# Patient Record
Sex: MALE | Race: WHITE | NOT HISPANIC OR LATINO | Employment: OTHER | URBAN - METROPOLITAN AREA
[De-identification: names, ages, dates, MRNs, and addresses within clinical notes are randomized per-mention and may not be internally consistent; named-entity substitution may affect disease eponyms.]

---

## 2017-09-15 ENCOUNTER — HOSPITAL ENCOUNTER (OUTPATIENT)
Dept: CARDIOLOGY | Facility: HOSPITAL | Age: 77
Discharge: HOME | End: 2017-09-15
Admitting: INTERNAL MEDICINE

## 2017-09-15 ENCOUNTER — HOSPITAL ENCOUNTER (OUTPATIENT)
Dept: CARDIOLOGY | Facility: HOSPITAL | Age: 77
Discharge: HOME | End: 2017-09-15
Attending: INTERNAL MEDICINE

## 2017-09-15 VITALS
SYSTOLIC BLOOD PRESSURE: 173 MMHG | HEIGHT: 71 IN | BODY MASS INDEX: 26.6 KG/M2 | DIASTOLIC BLOOD PRESSURE: 104 MMHG | RESPIRATION RATE: 20 BRPM | HEART RATE: 98 BPM | OXYGEN SATURATION: 100 % | WEIGHT: 190 LBS

## 2017-09-15 DIAGNOSIS — I10 ESSENTIAL HYPERTENSION: ICD-10-CM

## 2017-09-15 DIAGNOSIS — R00.2 PALPITATIONS: ICD-10-CM

## 2017-09-15 DIAGNOSIS — I20.8 STABLE ANGINA (HCC): ICD-10-CM

## 2017-09-15 DIAGNOSIS — R07.9 CHEST PAIN, UNSPECIFIED TYPE: Primary | ICD-10-CM

## 2017-09-15 DIAGNOSIS — R07.9 CHEST PAIN, UNSPECIFIED TYPE: ICD-10-CM

## 2017-09-15 PROBLEM — I20.89 STABLE ANGINA: Status: ACTIVE | Noted: 2017-09-15

## 2017-09-15 LAB
ALBUMIN SERPL-MCNC: 4.4 G/DL (ref 3.5–5.2)
ALBUMIN/GLOB SERPL: 1.3 G/DL
ALP SERPL-CCNC: 70 U/L (ref 39–117)
ALT SERPL W P-5'-P-CCNC: 25 U/L (ref 1–41)
ANION GAP SERPL CALCULATED.3IONS-SCNC: 13.8 MMOL/L
ASCENDING AORTA: 3.4 CM
AST SERPL-CCNC: 26 U/L (ref 1–40)
BASOPHILS # BLD AUTO: 0.03 10*3/MM3 (ref 0–0.2)
BASOPHILS NFR BLD AUTO: 0.4 % (ref 0–1.5)
BH CV ECHO MEAS - ACS: 2.4 CM
BH CV ECHO MEAS - AO MAX PG (FULL): 2.4 MMHG
BH CV ECHO MEAS - AO MAX PG: 6.3 MMHG
BH CV ECHO MEAS - AO MEAN PG (FULL): 1.6 MMHG
BH CV ECHO MEAS - AO MEAN PG: 3.8 MMHG
BH CV ECHO MEAS - AO ROOT AREA (BSA CORRECTED): 1.6
BH CV ECHO MEAS - AO ROOT AREA: 8.9 CM^2
BH CV ECHO MEAS - AO ROOT DIAM: 3.4 CM
BH CV ECHO MEAS - AO V2 MAX: 125.2 CM/SEC
BH CV ECHO MEAS - AO V2 MEAN: 93.4 CM/SEC
BH CV ECHO MEAS - AO V2 VTI: 29.9 CM
BH CV ECHO MEAS - AVA(I,A): 2.7 CM^2
BH CV ECHO MEAS - AVA(I,D): 2.7 CM^2
BH CV ECHO MEAS - AVA(V,A): 3 CM^2
BH CV ECHO MEAS - AVA(V,D): 3 CM^2
BH CV ECHO MEAS - BSA(HAYCOCK): 2.1 M^2
BH CV ECHO MEAS - BSA: 2.1 M^2
BH CV ECHO MEAS - BZI_BMI: 26.5 KILOGRAMS/M^2
BH CV ECHO MEAS - BZI_METRIC_HEIGHT: 180.3 CM
BH CV ECHO MEAS - BZI_METRIC_WEIGHT: 86.2 KG
BH CV ECHO MEAS - CONTRAST EF (2CH): 67.1 ML/M^2
BH CV ECHO MEAS - CONTRAST EF 4CH: 66.2 ML/M^2
BH CV ECHO MEAS - EDV(MOD-SP2): 70 ML
BH CV ECHO MEAS - EDV(MOD-SP4): 65 ML
BH CV ECHO MEAS - EDV(TEICH): 105.4 ML
BH CV ECHO MEAS - EF(CUBED): 73.7 %
BH CV ECHO MEAS - EF(MOD-SP2): 67.1 %
BH CV ECHO MEAS - EF(MOD-SP4): 66.2 %
BH CV ECHO MEAS - EF(TEICH): 65.4 %
BH CV ECHO MEAS - ESV(MOD-SP2): 23 ML
BH CV ECHO MEAS - ESV(MOD-SP4): 22 ML
BH CV ECHO MEAS - ESV(TEICH): 36.5 ML
BH CV ECHO MEAS - FS: 35.9 %
BH CV ECHO MEAS - IVS/LVPW: 0.95
BH CV ECHO MEAS - IVSD: 0.85 CM
BH CV ECHO MEAS - LAT PEAK E' VEL: 10 CM/SEC
BH CV ECHO MEAS - LV DIASTOLIC VOL/BSA (35-75): 31.5 ML/M^2
BH CV ECHO MEAS - LV MASS(C)D: 140.3 GRAMS
BH CV ECHO MEAS - LV MASS(C)DI: 68 GRAMS/M^2
BH CV ECHO MEAS - LV MAX PG: 3.8 MMHG
BH CV ECHO MEAS - LV MEAN PG: 2.1 MMHG
BH CV ECHO MEAS - LV SYSTOLIC VOL/BSA (12-30): 10.7 ML/M^2
BH CV ECHO MEAS - LV V1 MAX: 97.9 CM/SEC
BH CV ECHO MEAS - LV V1 MEAN: 65 CM/SEC
BH CV ECHO MEAS - LV V1 VTI: 21.3 CM
BH CV ECHO MEAS - LVIDD: 4.8 CM
BH CV ECHO MEAS - LVIDS: 3.1 CM
BH CV ECHO MEAS - LVLD AP2: 7.6 CM
BH CV ECHO MEAS - LVLD AP4: 7.6 CM
BH CV ECHO MEAS - LVLS AP2: 6.4 CM
BH CV ECHO MEAS - LVLS AP4: 6.4 CM
BH CV ECHO MEAS - LVOT AREA (M): 3.8 CM^2
BH CV ECHO MEAS - LVOT AREA: 3.8 CM^2
BH CV ECHO MEAS - LVOT DIAM: 2.2 CM
BH CV ECHO MEAS - LVPWD: 0.9 CM
BH CV ECHO MEAS - MED PEAK E' VEL: 11 CM/SEC
BH CV ECHO MEAS - MR MAX PG: 44.7 MMHG
BH CV ECHO MEAS - MR MAX VEL: 334.5 CM/SEC
BH CV ECHO MEAS - MV A DUR: 0.14 SEC
BH CV ECHO MEAS - MV A MAX VEL: 111.4 CM/SEC
BH CV ECHO MEAS - MV DEC SLOPE: 323.8 CM/SEC^2
BH CV ECHO MEAS - MV DEC TIME: 0.18 SEC
BH CV ECHO MEAS - MV E MAX VEL: 60.6 CM/SEC
BH CV ECHO MEAS - MV E/A: 0.54
BH CV ECHO MEAS - MV MAX PG: 5.4 MMHG
BH CV ECHO MEAS - MV MEAN PG: 2.1 MMHG
BH CV ECHO MEAS - MV P1/2T MAX VEL: 61.2 CM/SEC
BH CV ECHO MEAS - MV P1/2T: 55.4 MSEC
BH CV ECHO MEAS - MV V2 MAX: 116.1 CM/SEC
BH CV ECHO MEAS - MV V2 MEAN: 64.4 CM/SEC
BH CV ECHO MEAS - MV V2 VTI: 25.4 CM
BH CV ECHO MEAS - MVA P1/2T LCG: 3.6 CM^2
BH CV ECHO MEAS - MVA(P1/2T): 4 CM^2
BH CV ECHO MEAS - MVA(VTI): 3.2 CM^2
BH CV ECHO MEAS - PA ACC TIME: 0.1 SEC
BH CV ECHO MEAS - PA MAX PG (FULL): 3.1 MMHG
BH CV ECHO MEAS - PA MAX PG: 4.3 MMHG
BH CV ECHO MEAS - PA PR(ACCEL): 36.2 MMHG
BH CV ECHO MEAS - PA V2 MAX: 103.6 CM/SEC
BH CV ECHO MEAS - PULM A REVS DUR: 0.11 SEC
BH CV ECHO MEAS - PULM A REVS VEL: 28.4 CM/SEC
BH CV ECHO MEAS - PULM DIAS VEL: 37.7 CM/SEC
BH CV ECHO MEAS - PULM S/D: 1.1
BH CV ECHO MEAS - PULM SYS VEL: 43 CM/SEC
BH CV ECHO MEAS - PVA(V,A): 1.8 CM^2
BH CV ECHO MEAS - PVA(V,D): 1.8 CM^2
BH CV ECHO MEAS - QP/QS: 0.55
BH CV ECHO MEAS - RAP SYSTOLE: 3 MMHG
BH CV ECHO MEAS - RV MAX PG: 1.2 MMHG
BH CV ECHO MEAS - RV MEAN PG: 0.78 MMHG
BH CV ECHO MEAS - RV V1 MAX: 53.8 CM/SEC
BH CV ECHO MEAS - RV V1 MEAN: 42.5 CM/SEC
BH CV ECHO MEAS - RV V1 VTI: 12.7 CM
BH CV ECHO MEAS - RVOT AREA: 3.5 CM^2
BH CV ECHO MEAS - RVOT DIAM: 2.1 CM
BH CV ECHO MEAS - RVSP: 25 MMHG
BH CV ECHO MEAS - SI(AO): 129 ML/M^2
BH CV ECHO MEAS - SI(CUBED): 38.5 ML/M^2
BH CV ECHO MEAS - SI(LVOT): 39.4 ML/M^2
BH CV ECHO MEAS - SI(MOD-SP2): 22.8 ML/M^2
BH CV ECHO MEAS - SI(MOD-SP4): 20.8 ML/M^2
BH CV ECHO MEAS - SI(TEICH): 33.4 ML/M^2
BH CV ECHO MEAS - SUP REN AO DIAM: 2.1 CM
BH CV ECHO MEAS - SV(AO): 266.1 ML
BH CV ECHO MEAS - SV(CUBED): 79.4 ML
BH CV ECHO MEAS - SV(LVOT): 81.3 ML
BH CV ECHO MEAS - SV(MOD-SP2): 47 ML
BH CV ECHO MEAS - SV(MOD-SP4): 43 ML
BH CV ECHO MEAS - SV(RVOT): 45 ML
BH CV ECHO MEAS - SV(TEICH): 69 ML
BH CV ECHO MEAS - TAPSE (>1.6): 2 CM2
BH CV ECHO MEAS - TR MAX VEL: 235.6 CM/SEC
BH CV XLRA - RV BASE: 3.2 CM
BH CV XLRA - TDI S': 14 CM/SEC
BILIRUB SERPL-MCNC: 1 MG/DL (ref 0.1–1.2)
BUN BLD-MCNC: 15 MG/DL (ref 8–23)
BUN/CREAT SERPL: 14.7 (ref 7–25)
CALCIUM SPEC-SCNC: 9.4 MG/DL (ref 8.6–10.5)
CHLORIDE SERPL-SCNC: 98 MMOL/L (ref 98–107)
CO2 SERPL-SCNC: 23.2 MMOL/L (ref 22–29)
CREAT BLD-MCNC: 1.02 MG/DL (ref 0.76–1.27)
D DIMER PPP FEU-MCNC: 0.92 MCGFEU/ML (ref 0–0.49)
DEPRECATED RDW RBC AUTO: 42.5 FL (ref 37–54)
E/E' RATIO: 10.5
EOSINOPHIL # BLD AUTO: 0.07 10*3/MM3 (ref 0–0.7)
EOSINOPHIL NFR BLD AUTO: 0.9 % (ref 0.3–6.2)
ERYTHROCYTE [DISTWIDTH] IN BLOOD BY AUTOMATED COUNT: 12.9 % (ref 11.5–14.5)
GFR SERPL CREATININE-BSD FRML MDRD: 71 ML/MIN/1.73
GLOBULIN UR ELPH-MCNC: 3.3 GM/DL
GLUCOSE BLD-MCNC: 98 MG/DL (ref 65–99)
HCT VFR BLD AUTO: 42.7 % (ref 40.4–52.2)
HGB BLD-MCNC: 14.6 G/DL (ref 13.7–17.6)
IMM GRANULOCYTES # BLD: 0.02 10*3/MM3 (ref 0–0.03)
IMM GRANULOCYTES NFR BLD: 0.3 % (ref 0–0.5)
LEFT ATRIUM VOLUME INDEX: 29 ML/M2
LYMPHOCYTES # BLD AUTO: 2.09 10*3/MM3 (ref 0.9–4.8)
LYMPHOCYTES NFR BLD AUTO: 27.8 % (ref 19.6–45.3)
MCH RBC QN AUTO: 30.7 PG (ref 27–32.7)
MCHC RBC AUTO-ENTMCNC: 34.2 G/DL (ref 32.6–36.4)
MCV RBC AUTO: 89.7 FL (ref 79.8–96.2)
MONOCYTES # BLD AUTO: 0.78 10*3/MM3 (ref 0.2–1.2)
MONOCYTES NFR BLD AUTO: 10.4 % (ref 5–12)
NEUTROPHILS # BLD AUTO: 4.54 10*3/MM3 (ref 1.9–8.1)
NEUTROPHILS NFR BLD AUTO: 60.2 % (ref 42.7–76)
NT-PROBNP SERPL-MCNC: 111.7 PG/ML (ref 0–1800)
PLATELET # BLD AUTO: 326 10*3/MM3 (ref 140–500)
PMV BLD AUTO: 9.3 FL (ref 6–12)
POTASSIUM BLD-SCNC: 4 MMOL/L (ref 3.5–5.2)
PROT SERPL-MCNC: 7.7 G/DL (ref 6–8.5)
RBC # BLD AUTO: 4.76 10*6/MM3 (ref 4.6–6)
SINUS: 3.7 CM
SODIUM BLD-SCNC: 135 MMOL/L (ref 136–145)
STJ: 3.2 CM
TROPONIN T SERPL-MCNC: <0.01 NG/ML (ref 0–0.03)
WBC NRBC COR # BLD: 7.53 10*3/MM3 (ref 4.5–10.7)

## 2017-09-15 PROCEDURE — 83880 ASSAY OF NATRIURETIC PEPTIDE: CPT | Performed by: INTERNAL MEDICINE

## 2017-09-15 PROCEDURE — 85025 COMPLETE CBC W/AUTO DIFF WBC: CPT | Performed by: INTERNAL MEDICINE

## 2017-09-15 PROCEDURE — 80053 COMPREHEN METABOLIC PANEL: CPT | Performed by: INTERNAL MEDICINE

## 2017-09-15 PROCEDURE — 85379 FIBRIN DEGRADATION QUANT: CPT | Performed by: INTERNAL MEDICINE

## 2017-09-15 PROCEDURE — 93010 ELECTROCARDIOGRAM REPORT: CPT | Performed by: INTERNAL MEDICINE

## 2017-09-15 PROCEDURE — 84484 ASSAY OF TROPONIN QUANT: CPT | Performed by: INTERNAL MEDICINE

## 2017-09-15 PROCEDURE — 93306 TTE W/DOPPLER COMPLETE: CPT

## 2017-09-15 PROCEDURE — 93005 ELECTROCARDIOGRAM TRACING: CPT | Performed by: INTERNAL MEDICINE

## 2017-09-15 PROCEDURE — 99204 OFFICE O/P NEW MOD 45 MIN: CPT | Performed by: INTERNAL MEDICINE

## 2017-09-15 PROCEDURE — 94760 N-INVAS EAR/PLS OXIMETRY 1: CPT

## 2017-09-15 PROCEDURE — 93306 TTE W/DOPPLER COMPLETE: CPT | Performed by: INTERNAL MEDICINE

## 2017-09-15 PROCEDURE — 36415 COLL VENOUS BLD VENIPUNCTURE: CPT

## 2017-09-15 RX ORDER — SODIUM CHLORIDE 0.9 % (FLUSH) 0.9 %
10 SYRINGE (ML) INJECTION AS NEEDED
Status: DISCONTINUED | OUTPATIENT
Start: 2017-09-15 | End: 2017-09-15

## 2017-09-15 RX ORDER — NITROGLYCERIN 0.4 MG/1
0.4 TABLET SUBLINGUAL
Status: DISCONTINUED | OUTPATIENT
Start: 2017-09-15 | End: 2017-09-15

## 2017-09-15 RX ORDER — RANITIDINE HCL 75 MG
75 TABLET ORAL NIGHTLY
COMMUNITY
End: 2020-06-24

## 2017-09-15 NOTE — PROGRESS NOTES
Date of Office Visit: 09/15/2017  Encounter Provider: Raudel Álvarez MD  Place of Service: [facility] CARDIOLOGY  Patient Name: Son Dang  :1940    Chief Complaint   Patient presents with   • Chest Pain   :     HPI: Son Dang is a 76 y.o. male who presents today for evaluation of chest tightness and palpitations.  He is  Sandra Chunrandy's father and he drove up from [email or web address].    He denies any prior history of heart disease.  He has treated hypertension.  He denies diabetes or hyperlipidemia and has never smoked.  He does not have a family history of significant cardiac disease.      Last night, he woke up with palpitations.  He had a regularly irregular beat which sounds like it was initially trigeminy.  He checked his blood pressure, which was 112/70, and monitored his pulse until the ectopics became less and less frequent. He was acutely aware of them but they didn't make him lightheaded; he didn't have chest pain or dyspnea during that episode.    He also notes exertional chest tightness for the last month.  It has not happened at rest.  It doesn't happen with low levels of exertion but it is predictable with moderate levels, and gets worse as he goes faster or works harder.  When he stops to rest, it resolves.  It does make him short winded, but he denies diaphoresis or nausea.      He denies orthopnea or PND.  He has mild left pedal edema but had a prior hip surgery.  He does not have bleeding problems.      Past Medical History:   Diagnosis Date   • Asthma    • HTN (hypertension)        Past Surgical History:   Procedure Laterality Date   • BACK SURGERY      lumbar laminectomy   • HIP SURGERY Left 2016       Social History     Social History   • Marital status:      Spouse name: N/A   • Number of children: N/A   • Years of education: master's degree     Occupational History   • Retired      Social History Main Topics   • Smoking status: Never Smoker   •  "Smokeless tobacco: Never Used   • Alcohol use Yes      Comment: once a day   • Drug use: No   • Sexual activity: Not on file     Other Topics Concern   • Not on file     Social History Narrative       History reviewed. No pertinent family history.    Review of Systems   Cardiovascular: Positive for chest pain and palpitations.   Musculoskeletal: Positive for back pain.   All other systems reviewed and are negative.      No Known Allergies      Current Outpatient Prescriptions:   •  Coenzyme Q10 (COQ10) 200 MG capsule, Take  by mouth., Disp: , Rfl:   •  irbesartan (AVAPRO) 150 MG tablet, Take  by mouth., Disp: , Rfl:   •  NIFEdipine XL (PROCARDIA XL) 30 MG 24 hr tablet, Take  by mouth., Disp: , Rfl:   •  omeprazole (PriLOSEC) 20 MG capsule, Take  by mouth., Disp: , Rfl:   •  pitavastatin calcium (LIVALO) 2 MG tablet tablet, Take  by mouth., Disp: , Rfl:   •  raNITIdine (ZANTAC) 75 MG tablet, Take 75 mg by mouth Every Night., Disp: , Rfl:   •  VOLTAREN 1 % gel gel, , Disp: , Rfl:   •  metoprolol tartrate (LOPRESSOR) 25 MG tablet, Take 1 tablet by mouth 2 (Two) Times a Day., Disp: 60 tablet, Rfl: 0  No current facility-administered medications for this encounter.      Objective:     Vitals:    09/15/17 1430 09/15/17 1433   BP: 162/98 (!) 173/104   BP Location: Left arm Right arm   Patient Position: Sitting Sitting   Pulse: 98    Resp: 20    SpO2: 100%    Weight: 190 lb (86.2 kg)    Height: 71\" (180.3 cm)      Body mass index is 26.5 kg/(m^2).    Physical Exam   Constitutional: He is oriented to person, place, and time. He appears well-developed and well-nourished.   HENT:   Head: Normocephalic.   Nose: Nose normal.   Mouth/Throat: Oropharynx is clear and moist.   Eyes: Conjunctivae and EOM are normal. Pupils are equal, round, and reactive to light.   Neck: Normal range of motion. No JVD present.   Cardiovascular: Normal rate, regular rhythm, normal heart sounds and intact distal pulses.    No murmur " heard.  Pulmonary/Chest: Effort normal and breath sounds normal.   Abdominal: Soft. He exhibits no mass. There is no tenderness.   Musculoskeletal: Normal range of motion. He exhibits no edema.   Lymphadenopathy:     He has no cervical adenopathy.   Neurological: He is alert and oriented to person, place, and time. No cranial nerve deficit.   Skin: Skin is warm and dry. No rash noted.   Psychiatric: He has a normal mood and affect. His behavior is normal. Judgment and thought content normal.   Vitals reviewed.        ECG 12 Lead  Date/Time: 9/15/2017 4:22 PM  Performed by: MALORIE OREILLY  Authorized by: MALORIE OREILLY   Comparison: not compared with previous ECG   Previous ECG: no previous ECG available  Rhythm: sinus rhythm  Conduction: conduction normal  ST Segments: ST segments normal  T Waves: T waves normal  QRS axis: normal  Other: no other findings  Clinical impression: normal ECG              Assessment:       Diagnosis Plan   1. Stable angina  Case Request Cath Lab: Left ventriculography, Left Heart Cath, Coronary angiography   2.  Palpitations        3. Hypertension           Plan:       Mr. Dang has symptoms consistent with stable angina.  Today, he had normal labs including CBC, CMP, BNP, and troponin.  His D-Dimer was minimally elevated, but his symptoms are not suggestive of PE, and his right heart and RVSP are normal on echo.  Given the typical nature of his symptoms, I have recommended coronary angiography as our first assessment.  I have recommended daily low dose aspirin and have started metoprolol tartrate, 25mg BID.  He will have an outpatient cath in just a few days.  He knows to seek emergency care if he develops symptoms at rest.    2.  His palpitations are most certainly ventricular ectopics.  He had monomorphic PVCs on telemetry today.  His LVEF is normal and his cath is pending.  The metoprolol may help.    3.  His BP was very high today but he is appropriately anxious about the visit. He  checks at home and his average BP is ~140/70.  He will occasionally have higher values after exercising, and then other times it's completely normal.  We'll follow this.      Sincerely,       Raudel Álvarez MD

## 2017-09-19 ENCOUNTER — HOSPITAL ENCOUNTER (OUTPATIENT)
Facility: HOSPITAL | Age: 77
Setting detail: HOSPITAL OUTPATIENT SURGERY
Discharge: HOME | End: 2017-09-19
Attending: INTERNAL MEDICINE | Admitting: INTERNAL MEDICINE

## 2017-09-19 VITALS
SYSTOLIC BLOOD PRESSURE: 125 MMHG | DIASTOLIC BLOOD PRESSURE: 86 MMHG | HEIGHT: 71 IN | RESPIRATION RATE: 18 BRPM | BODY MASS INDEX: 26.6 KG/M2 | OXYGEN SATURATION: 96 % | HEART RATE: 57 BPM | WEIGHT: 190 LBS | TEMPERATURE: 98.7 F

## 2017-09-19 DIAGNOSIS — I20.8 STABLE ANGINA (HCC): ICD-10-CM

## 2017-09-19 PROCEDURE — 93458 L HRT ARTERY/VENTRICLE ANGIO: CPT | Performed by: INTERNAL MEDICINE

## 2017-09-19 PROCEDURE — 25010000002 HEPARIN (PORCINE) PER 1000 UNITS: Performed by: INTERNAL MEDICINE

## 2017-09-19 PROCEDURE — C1894 INTRO/SHEATH, NON-LASER: HCPCS | Performed by: INTERNAL MEDICINE

## 2017-09-19 PROCEDURE — C1769 GUIDE WIRE: HCPCS | Performed by: INTERNAL MEDICINE

## 2017-09-19 PROCEDURE — 25010000002 FENTANYL CITRATE (PF) 100 MCG/2ML SOLUTION: Performed by: INTERNAL MEDICINE

## 2017-09-19 PROCEDURE — 99152 MOD SED SAME PHYS/QHP 5/>YRS: CPT | Performed by: INTERNAL MEDICINE

## 2017-09-19 PROCEDURE — 25010000002 MIDAZOLAM PER 1 MG: Performed by: INTERNAL MEDICINE

## 2017-09-19 PROCEDURE — 0 IOPAMIDOL PER 1 ML

## 2017-09-19 RX ORDER — SODIUM CHLORIDE 0.9 % (FLUSH) 0.9 %
1-10 SYRINGE (ML) INJECTION AS NEEDED
Status: DISCONTINUED | OUTPATIENT
Start: 2017-09-19 | End: 2017-09-19 | Stop reason: HOSPADM

## 2017-09-19 RX ORDER — ACETAMINOPHEN 325 MG/1
650 TABLET ORAL EVERY 4 HOURS PRN
Status: DISCONTINUED | OUTPATIENT
Start: 2017-09-19 | End: 2017-09-19 | Stop reason: HOSPADM

## 2017-09-19 RX ORDER — SODIUM CHLORIDE 9 MG/ML
100 INJECTION, SOLUTION INTRAVENOUS CONTINUOUS
Status: DISCONTINUED | OUTPATIENT
Start: 2017-09-19 | End: 2017-09-19 | Stop reason: HOSPADM

## 2017-09-19 RX ORDER — HYDROCODONE BITARTRATE AND ACETAMINOPHEN 5; 325 MG/1; MG/1
1 TABLET ORAL EVERY 4 HOURS PRN
Status: DISCONTINUED | OUTPATIENT
Start: 2017-09-19 | End: 2017-09-19 | Stop reason: HOSPADM

## 2017-09-19 RX ORDER — NALOXONE HCL 0.4 MG/ML
0.4 VIAL (ML) INJECTION
Status: DISCONTINUED | OUTPATIENT
Start: 2017-09-19 | End: 2017-09-19 | Stop reason: HOSPADM

## 2017-09-19 RX ORDER — SODIUM CHLORIDE 9 MG/ML
75 INJECTION, SOLUTION INTRAVENOUS CONTINUOUS
Status: DISCONTINUED | OUTPATIENT
Start: 2017-09-19 | End: 2017-09-19 | Stop reason: HOSPADM

## 2017-09-19 RX ORDER — MIDAZOLAM HYDROCHLORIDE 1 MG/ML
INJECTION INTRAMUSCULAR; INTRAVENOUS AS NEEDED
Status: DISCONTINUED | OUTPATIENT
Start: 2017-09-19 | End: 2017-09-19 | Stop reason: HOSPADM

## 2017-09-19 RX ORDER — LIDOCAINE HYDROCHLORIDE 20 MG/ML
INJECTION, SOLUTION INFILTRATION; PERINEURAL AS NEEDED
Status: DISCONTINUED | OUTPATIENT
Start: 2017-09-19 | End: 2017-09-19 | Stop reason: HOSPADM

## 2017-09-19 RX ORDER — FENTANYL CITRATE 50 UG/ML
INJECTION, SOLUTION INTRAMUSCULAR; INTRAVENOUS AS NEEDED
Status: DISCONTINUED | OUTPATIENT
Start: 2017-09-19 | End: 2017-09-19 | Stop reason: HOSPADM

## 2017-09-19 RX ORDER — LIDOCAINE HYDROCHLORIDE 10 MG/ML
0.1 INJECTION, SOLUTION EPIDURAL; INFILTRATION; INTRACAUDAL; PERINEURAL ONCE AS NEEDED
Status: DISCONTINUED | OUTPATIENT
Start: 2017-09-19 | End: 2017-09-19 | Stop reason: HOSPADM

## 2017-09-19 RX ADMIN — SODIUM CHLORIDE 75 ML/HR: 9 INJECTION, SOLUTION INTRAVENOUS at 10:28

## 2017-09-19 NOTE — DISCHARGE INSTRUCTIONS
[facility]  [address] [place] [address]    Coronary Angiogram (Radial/Ulnar Approach) After Care    Refer to this sheet in the next few weeks. These instructions provide you with information on caring for yourself after your procedure. Your caregiver may also give you more specific instructions. Your treatment has been planned according to current medical practices, but problems sometimes occur. Call your caregiver if you have any problems or questions after your procedure.    Home Care Instructions:  · You may shower the day after the procedure. Remove the bandage (dressing) and gently wash the site with plain soap and water. Gently pat the site dry. You may apply a band aid daily for 2 days if desired.    · Do not apply powder or lotion to the site.  · Do not submerge the affected site in water for 3 to 5 days or until the site is completely healed.   · Do not lift, push or pull anything over 10 pounds for 2 days after your procedure.  · Inspect the site at least twice daily. You may notice some bruising at the site and it may be tender for 1 to 2 weeks.     · Increase your fluid intake for the next 2 days.    · Keep arm elevated for 24 hours. For the remainder of the day, keep your arm in “Pledge of Allegiance” position when up and about.     · You may drive 24 hours after the procedure unless otherwise instructed by your caregiver.  · Do not operate machinery or power tools for 24 hours.  · A responsible adult should be with you for the first 24 hours after you arrive home. Do not make any important legal decisions or sign legal papers for 24 hours.      Call Your Doctor if:   · You have unusual pain at the radial/ulnar (wrist) site.  · You have redness, warmth, swelling, or pain at the radial/ulnar (wrist) site.  · You have drainage (other than a small amount of blood on the dressing).  · You have chills or a fever > 101.  · Your arm becomes pale or dark, cool, tingly, or numb.  · You  have heavy bleeding from the site, hold pressure on the site for 20 minutes.  If the bleeding stops, apply a fresh bandage and call your cardiologist.  However, if you continue to have bleeding, call 911.

## 2017-09-19 NOTE — PLAN OF CARE
Problem: Cardiac Catheterization with/without PCI (Adult)  Goal: Signs and Symptoms of Listed Potential Problems Will be Absent or Manageable (Cardiac Catheterization with/without PCI)  Outcome: Outcome(s) achieved Date Met:  09/19/17 09/19/17 1442   Cardiac Catheterization with/without PCI   Problems Present (Cardiac Catheterization) none

## 2017-09-19 NOTE — PLAN OF CARE
Problem: Patient Care Overview (Adult)  Goal: Discharge Needs Assessment  Outcome: Outcome(s) achieved Date Met:  09/19/17 09/19/17 1443   Discharge Needs Assessment   Concerns To Be Addressed no discharge needs identified

## 2017-09-19 NOTE — H&P (VIEW-ONLY)
Date of Office Visit: 09/15/2017  Encounter Provider: Raudel Álvarez MD  Place of Service: [facility] CARDIOLOGY  Patient Name: Son Dang  :1940    Chief Complaint   Patient presents with   • Chest Pain   :     HPI: Son Dang is a 76 y.o. male who presents today for evaluation of chest tightness and palpitations.  He is  Sandra Chunrandy's father and he drove up from [place].    He denies any prior history of heart disease.  He has treated hypertension.  He denies diabetes or hyperlipidemia and has never smoked.  He does not have a family history of significant cardiac disease.      Last night, he woke up with palpitations.  He had a regularly irregular beat which sounds like it was initially trigeminy.  He checked his blood pressure, which was 112/70, and monitored his pulse until the ectopics became less and less frequent. He was acutely aware of them but they didn't make him lightheaded; he didn't have chest pain or dyspnea during that episode.    He also notes exertional chest tightness for the last month.  It has not happened at rest.  It doesn't happen with low levels of exertion but it is predictable with moderate levels, and gets worse as he goes faster or works harder.  When he stops to rest, it resolves.  It does make him short winded, but he denies diaphoresis or nausea.      He denies orthopnea or PND.  He has mild left pedal edema but had a prior hip surgery.  He does not have bleeding problems.      Past Medical History:   Diagnosis Date   • Asthma    • HTN (hypertension)        Past Surgical History:   Procedure Laterality Date   • BACK SURGERY      lumbar laminectomy   • HIP SURGERY Left 2016       Social History     Social History   • Marital status:      Spouse name: N/A   • Number of children: N/A   • Years of education: master's degree     Occupational History   • Retired      Social History Main Topics   • Smoking status: Never Smoker   •  "Smokeless tobacco: Never Used   • Alcohol use Yes      Comment: once a day   • Drug use: No   • Sexual activity: Not on file     Other Topics Concern   • Not on file     Social History Narrative       History reviewed. No pertinent family history.    Review of Systems   Cardiovascular: Positive for chest pain and palpitations.   Musculoskeletal: Positive for back pain.   All other systems reviewed and are negative.      No Known Allergies      Current Outpatient Prescriptions:   •  Coenzyme Q10 (COQ10) 200 MG capsule, Take  by mouth., Disp: , Rfl:   •  irbesartan (AVAPRO) 150 MG tablet, Take  by mouth., Disp: , Rfl:   •  NIFEdipine XL (PROCARDIA XL) 30 MG 24 hr tablet, Take  by mouth., Disp: , Rfl:   •  omeprazole (PriLOSEC) 20 MG capsule, Take  by mouth., Disp: , Rfl:   •  pitavastatin calcium (LIVALO) 2 MG tablet tablet, Take  by mouth., Disp: , Rfl:   •  raNITIdine (ZANTAC) 75 MG tablet, Take 75 mg by mouth Every Night., Disp: , Rfl:   •  VOLTAREN 1 % gel gel, , Disp: , Rfl:   •  metoprolol tartrate (LOPRESSOR) 25 MG tablet, Take 1 tablet by mouth 2 (Two) Times a Day., Disp: 60 tablet, Rfl: 0  No current facility-administered medications for this encounter.      Objective:     Vitals:    09/15/17 1430 09/15/17 1433   BP: 162/98 (!) 173/104   BP Location: Left arm Right arm   Patient Position: Sitting Sitting   Pulse: 98    Resp: 20    SpO2: 100%    Weight: 190 lb (86.2 kg)    Height: 71\" (180.3 cm)      Body mass index is 26.5 kg/(m^2).    Physical Exam   Constitutional: He is oriented to person, place, and time. He appears well-developed and well-nourished.   HENT:   Head: Normocephalic.   Nose: Nose normal.   Mouth/Throat: Oropharynx is clear and moist.   Eyes: Conjunctivae and EOM are normal. Pupils are equal, round, and reactive to light.   Neck: Normal range of motion. No JVD present.   Cardiovascular: Normal rate, regular rhythm, normal heart sounds and intact distal pulses.    No murmur " heard.  Pulmonary/Chest: Effort normal and breath sounds normal.   Abdominal: Soft. He exhibits no mass. There is no tenderness.   Musculoskeletal: Normal range of motion. He exhibits no edema.   Lymphadenopathy:     He has no cervical adenopathy.   Neurological: He is alert and oriented to person, place, and time. No cranial nerve deficit.   Skin: Skin is warm and dry. No rash noted.   Psychiatric: He has a normal mood and affect. His behavior is normal. Judgment and thought content normal.   Vitals reviewed.        ECG 12 Lead  Date/Time: 9/15/2017 4:22 PM  Performed by: MALORIE OREILLY  Authorized by: MALORIE OREILLY   Comparison: not compared with previous ECG   Previous ECG: no previous ECG available  Rhythm: sinus rhythm  Conduction: conduction normal  ST Segments: ST segments normal  T Waves: T waves normal  QRS axis: normal  Other: no other findings  Clinical impression: normal ECG              Assessment:       Diagnosis Plan   1. Stable angina  Case Request Cath Lab: Left ventriculography, Left Heart Cath, Coronary angiography   2.  Palpitations        3. Hypertension           Plan:       Mr. Dang has symptoms consistent with stable angina.  Today, he had normal labs including CBC, CMP, BNP, and troponin.  His D-Dimer was minimally elevated, but his symptoms are not suggestive of PE, and his right heart and RVSP are normal on echo.  Given the typical nature of his symptoms, I have recommended coronary angiography as our first assessment.  I have recommended daily low dose aspirin and have started metoprolol tartrate, 25mg BID.  He will have an outpatient cath in just a few days.  He knows to seek emergency care if he develops symptoms at rest.    2.  His palpitations are most certainly ventricular ectopics.  He had monomorphic PVCs on telemetry today.  His LVEF is normal and his cath is pending.  The metoprolol may help.    3.  His BP was very high today but he is appropriately anxious about the visit. He  checks at home and his average BP is ~140/70.  He will occasionally have higher values after exercising, and then other times it's completely normal.  We'll follow this.      Sincerely,       Raudel Álvarez MD

## 2017-09-19 NOTE — PLAN OF CARE
Problem: Patient Care Overview (Adult)  Goal: Plan of Care Review  Outcome: Outcome(s) achieved Date Met:  09/19/17 09/19/17 1443   Coping/Psychosocial Response Interventions   Plan Of Care Reviewed With patient;spouse   Patient Care Overview   Progress progress towards functional goals is fair   Outcome Evaluation   Outcome Summary/Follow up Plan READY FOR DISCHARGE

## 2017-09-19 NOTE — PLAN OF CARE
Problem: Patient Care Overview (Adult)  Goal: Adult Individualization and Mutuality  Outcome: Outcome(s) achieved Date Met:  09/19/17

## 2017-09-26 ENCOUNTER — TELEPHONE (OUTPATIENT)
Dept: CARDIOLOGY | Facility: HOSPITAL | Age: 77
End: 2017-09-26

## 2017-10-30 ENCOUNTER — OFFICE VISIT (OUTPATIENT)
Dept: CARDIOLOGY | Facility: CLINIC | Age: 77
End: 2017-10-30

## 2017-10-30 VITALS
HEART RATE: 62 BPM | DIASTOLIC BLOOD PRESSURE: 85 MMHG | HEIGHT: 72 IN | WEIGHT: 198.4 LBS | BODY MASS INDEX: 26.87 KG/M2 | SYSTOLIC BLOOD PRESSURE: 140 MMHG

## 2017-10-30 DIAGNOSIS — I25.10 NONOCCLUSIVE CORONARY ATHEROSCLEROSIS OF NATIVE CORONARY ARTERY: ICD-10-CM

## 2017-10-30 DIAGNOSIS — I10 ESSENTIAL HYPERTENSION: Primary | ICD-10-CM

## 2017-10-30 DIAGNOSIS — I49.3 PVCS (PREMATURE VENTRICULAR CONTRACTIONS): ICD-10-CM

## 2017-10-30 PROCEDURE — 93000 ELECTROCARDIOGRAM COMPLETE: CPT | Performed by: INTERNAL MEDICINE

## 2017-10-30 PROCEDURE — 99213 OFFICE O/P EST LOW 20 MIN: CPT | Performed by: INTERNAL MEDICINE

## 2017-10-30 RX ORDER — ASPIRIN 81 MG/1
81 TABLET, CHEWABLE ORAL DAILY
COMMUNITY
End: 2020-06-24

## 2017-10-30 NOTE — PROGRESS NOTES
Date of Office Visit: 10/30/2017  Encounter Provider: Raudel Álvarez MD  Place of Service: [facility] CARDIOLOGY  Patient Name: Son Dang  :1940    Chief Complaint   Patient presents with   • Chest Pain     follow up    • Palpitations   :     HPI: Son Dang is a 76 y.o. male who presents today to follow up.     He initially presented in 2017 with palpitations and exertional chest discomfort.  An echocardiogram was unremarkable; his EKG showed monomorphic PVCs.  He underwent coronary angiography which showed luminal irregularities only, and was started on metoprolol.    His chest pain has resolved. He has been checking his BP at home and gest 125-140/85 mm Hg.  He is trying to stay active.  His PVCs still occur (at night, while lying down before going to sleep), and they're symptomatic, but he denies lightheadedness or syncope.  He denies exertional dyspnea, edema, orthopnea, or PND.        Past Medical History:   Diagnosis Date   • Asthma    • HTN (hypertension)    • Nonocclusive coronary atherosclerosis of native coronary artery     20% LI by cath 2017   • Symptomatic PVCs        Past Surgical History:   Procedure Laterality Date   • BACK SURGERY      lumbar laminectomy   • CARDIAC CATHETERIZATION N/A 2017    Procedure: Left ventriculography;  Surgeon: Luke Wong MD;  Location: [facility] INVASIVE LOCATION;  Service:    • CARDIAC CATHETERIZATION N/A 2017    Procedure: Left Heart Cath;  Surgeon: Luke Wong MD;  Location: [facility] INVASIVE LOCATION;  Service:    • CARDIAC CATHETERIZATION N/A 2017    Procedure: Coronary angiography;  Surgeon: Luke Wong MD;  Location: [facility] INVASIVE LOCATION;  Service:    • HIP SURGERY Left 2016       Social History     Social History   • Marital status:      Spouse name: N/A   • Number of children: N/A   • Years of education: master's degree     Occupational History  "  • Retired      Social History Main Topics   • Smoking status: Never Smoker   • Smokeless tobacco: Never Used      Comment: caffeine use: half caffeine/ one cup daily.    • Alcohol use Yes      Comment: once a day   • Drug use: No   • Sexual activity: Not on file     Other Topics Concern   • Not on file     Social History Narrative       History reviewed. No pertinent family history.    Review of Systems   Cardiovascular: Positive for palpitations.   All other systems reviewed and are negative.      No Known Allergies      Current Outpatient Prescriptions:   •  aspirin 81 MG chewable tablet, Chew 81 mg Daily., Disp: , Rfl:   •  Coenzyme Q10 (COQ10) 200 MG capsule, Take 200 mg by mouth Daily., Disp: , Rfl:   •  irbesartan (AVAPRO) 150 MG tablet, Take 150 mg by mouth Daily., Disp: , Rfl:   •  metoprolol tartrate (LOPRESSOR) 25 MG tablet, Take 1 tablet by mouth 2 (Two) Times a Day., Disp: 180 tablet, Rfl: 0  •  NIFEdipine XL (PROCARDIA XL) 30 MG 24 hr tablet, Take 30 mg by mouth Daily., Disp: , Rfl:   •  omeprazole (PriLOSEC) 20 MG capsule, Take 20 mg by mouth Daily., Disp: , Rfl:   •  pitavastatin calcium (LIVALO) 2 MG tablet tablet, Take 2 mg by mouth Daily., Disp: , Rfl:   •  raNITIdine (ZANTAC) 75 MG tablet, Take 75 mg by mouth Every Night., Disp: , Rfl:   •  VOLTAREN 1 % gel gel, Apply  topically As Needed., Disp: , Rfl:      Objective:     Vitals:    10/30/17 1435 10/30/17 1451   BP: 170/98 140/85   BP Location: Left arm    Pulse: 62    Weight: 198 lb 6.4 oz (90 kg)    Height: 72\" (182.9 cm)      Body mass index is 26.91 kg/(m^2).    Physical Exam   Constitutional: He is oriented to person, place, and time. He appears well-developed and well-nourished.   HENT:   Head: Normocephalic.   Nose: Nose normal.   Mouth/Throat: Oropharynx is clear and moist.   Eyes: Conjunctivae and EOM are normal. Pupils are equal, round, and reactive to light.   Neck: Normal range of motion. No JVD present.   Cardiovascular: Normal " rate, regular rhythm, normal heart sounds and intact distal pulses.    No murmur heard.  Pulmonary/Chest: Effort normal and breath sounds normal.   Abdominal: Soft. He exhibits no mass. There is no tenderness.   Musculoskeletal: Normal range of motion. He exhibits no edema.   Lymphadenopathy:     He has no cervical adenopathy.   Neurological: He is alert and oriented to person, place, and time. No cranial nerve deficit.   Skin: Skin is warm and dry. No rash noted.   Psychiatric: He has a normal mood and affect. His behavior is normal. Judgment and thought content normal.   Vitals reviewed.        ECG 12 Lead  Date/Time: 10/30/2017 3:16 PM  Performed by: RAUDEL ÁLVAREZ  Authorized by: RAUDEL ÁLVAREZ   Comparison: compared with previous ECG   Comparison to previous ECG: C/w prior PVCs have resolved  Rhythm: sinus rhythm  Conduction: 1st degree  ST Segments: ST segments normal  T Waves: T waves normal  Other findings: PRWP  Clinical impression: abnormal ECG              Assessment:       Diagnosis Plan   1. Essential hypertension     2. PVCs (premature ventricular contractions)     3. Nonocclusive coronary atherosclerosis of native coronary artery            Plan:       1.  His BP was high on arrival, and was 140/75 upon recheck.  He checks regularly at home and it's fairly well controlled.  If the diastolic value creeps upward, I will add HCTZ to his irbesartan.      2.  He has benign but symptomatic PVCs.  As his coronaries are generally okay, and his echo was normal, I favor just observing these.  If they become pervasive, we may consider antiarrhythmic therapy.    3.  He has minimal atherosclerosis (20% LI) and is on aspirin and pitavastatin.  His chest pain has resolved.   Sincerely,       Raudel Álvarez MD

## 2017-11-27 RX ORDER — CEPHALEXIN 500 MG/1
500 TABLET ORAL ONCE
Qty: 4 TABLET | Refills: 4 | Status: SHIPPED | OUTPATIENT
Start: 2017-11-27 | End: 2017-11-27

## 2018-02-05 ENCOUNTER — TELEPHONE (OUTPATIENT)
Dept: CARDIOLOGY | Facility: HOSPITAL | Age: 78
End: 2018-02-05

## 2018-06-04 DIAGNOSIS — I10 ESSENTIAL HYPERTENSION: Primary | ICD-10-CM

## 2018-06-04 DIAGNOSIS — E78.2 MIXED HYPERLIPIDEMIA: ICD-10-CM

## 2019-05-01 ENCOUNTER — TELEPHONE (OUTPATIENT)
Dept: CARDIOLOGY | Facility: CLINIC | Age: 79
End: 2019-05-01

## 2019-05-01 NOTE — TELEPHONE ENCOUNTER
I left a VM on both phone numbers and asked for him to call me back so we can come up with a plan.

## 2019-05-01 NOTE — TELEPHONE ENCOUNTER
----- Message from Sandra Dang MD sent at 5/1/2019  2:40 PM EDT -----  Hey my dad has been having symptomatic PVCs again. He had an EKG at his primary MD and it looks the same as before. I was thinking a Zio (could be mailed and save a trip) and a repeat echo when he comes up for an appt. Do you want to see him back or send him to EP? Up to you. I'm trying to stay out of it.     JL

## 2019-05-03 NOTE — TELEPHONE ENCOUNTER
He was having PVCs that were keeping him up at night.  I bumped the metoprolol tartrate up to 50 mg twice daily and told him to take 200mg of magnesium before bed.  He did not have any PVCs last night.  I told him to continue this regimen throughout the weekend and see how he does.

## 2019-05-06 NOTE — TELEPHONE ENCOUNTER
I called pt, to follow up since med recommendations, but had to leave a vm for him to contact the office.

## 2019-05-07 RX ORDER — MAGNESIUM 200 MG
1 TABLET ORAL NIGHTLY
Start: 2019-05-07 | End: 2020-06-24

## 2019-05-07 NOTE — TELEPHONE ENCOUNTER
I s/w him -- known PVCs but definitely more than usual.  Agree with plan for higher metoprolol dose and oral magnesium.  I will call him again this Friday to reassess.  Please send back to me with that date.    JANET

## 2019-05-07 NOTE — TELEPHONE ENCOUNTER
I called pt, he reports since the increased dose of Metoprolol Tart 50 mg bid and the Mag 200 mg at night the PVCs are minimal, but still present.  He has only taken 2 doses of the increased dose.  He did state that he had a glass of wine last night and a beer the day before, but had has minimized his caffeine.  Pt reports that he will continue the recommended regimen.   Unless you have any further recommendations.

## 2019-05-10 NOTE — TELEPHONE ENCOUNTER
I called and left a VM -- asked him to update us by calling, or sending a bright box message, or letting Dr Dang know.    JANET

## 2019-07-03 RX ORDER — METOPROLOL TARTRATE 50 MG/1
50 TABLET, FILM COATED ORAL 2 TIMES DAILY
Qty: 180 TABLET | Refills: 3 | Status: SHIPPED | OUTPATIENT
Start: 2019-07-03 | End: 2019-07-03 | Stop reason: SDUPTHER

## 2019-07-03 RX ORDER — METOPROLOL TARTRATE 50 MG/1
50 TABLET, FILM COATED ORAL 2 TIMES DAILY
Qty: 180 TABLET | Refills: 3 | Status: SHIPPED | OUTPATIENT
Start: 2019-07-03 | End: 2020-06-18 | Stop reason: SDUPTHER

## 2020-06-18 RX ORDER — METOPROLOL TARTRATE 50 MG/1
50 TABLET, FILM COATED ORAL 2 TIMES DAILY
Qty: 180 TABLET | Refills: 3 | Status: SHIPPED | OUTPATIENT
Start: 2020-06-18 | End: 2021-06-15 | Stop reason: SDUPTHER

## 2020-06-24 ENCOUNTER — OFFICE VISIT (OUTPATIENT)
Dept: CARDIOLOGY | Facility: CLINIC | Age: 80
End: 2020-06-24

## 2020-06-24 VITALS
HEIGHT: 72 IN | SYSTOLIC BLOOD PRESSURE: 115 MMHG | HEART RATE: 68 BPM | WEIGHT: 192 LBS | BODY MASS INDEX: 26.01 KG/M2 | DIASTOLIC BLOOD PRESSURE: 72 MMHG

## 2020-06-24 DIAGNOSIS — I49.3 PVC (PREMATURE VENTRICULAR CONTRACTION): Primary | ICD-10-CM

## 2020-06-24 DIAGNOSIS — L65.9 HAIR LOSS: ICD-10-CM

## 2020-06-24 DIAGNOSIS — I25.10 NONOCCLUSIVE CORONARY ATHEROSCLEROSIS OF NATIVE CORONARY ARTERY: ICD-10-CM

## 2020-06-24 DIAGNOSIS — I10 ESSENTIAL HYPERTENSION: ICD-10-CM

## 2020-06-24 PROCEDURE — 99443 PR PHYS/QHP TELEPHONE EVALUATION 21-30 MIN: CPT | Performed by: NURSE PRACTITIONER

## 2020-06-24 RX ORDER — FAMOTIDINE 40 MG/1
40 TABLET, FILM COATED ORAL DAILY
COMMUNITY
End: 2022-05-16

## 2020-06-24 NOTE — PROGRESS NOTES
Telehealth Visit    Date of Visit: 2020  Encounter Provider: CISCO Castle  Place of Service: [facility] CARDIOLOGY  Patient Name: Son Dang  :1940  Primary Cardiologist: Dr. Raudel Álvarez    Chief Complaint   Patient presents with   • Follow-up   :     Dear Dr. Landon Hyatt,     HPI: Son Dang is a pleasant 79 y.o. male who is an established patient of our practice. Due to COVID-19 virus, I am conducting a telehealth visit via telephone with patient and he has consented to this visit today. He is a new patient to me and his previous records have been reviewed.    In 2017, he was having palpitations and exertional chest discomfort.  EKG showed monomorphic PVCs.  Echocardiogram was unremarkable and coronary angiography showed luminal irregularities.  He was started on metoprolol and his chest pain resolved.    In 2017, he followed up with Dr. Álvarez in the office.  His blood pressure was borderline elevated and she recommended just continuing to monitor.  She recommended that he continue with aspirin and pitavastatin.    In May 2019, he contacted our office via phone.  He was having palpitations and PVCs that were keeping him up at nighttime.  His metoprolol tartrate was increased to 50 mg twice a day and he was recommended to take magnesium 200 mg before bedtime.    Today is a follow-up visit.  He says his palpitations have improved with the beta-blocker therapy.  He says he was having them often before bedtime before.  He is concerned that the beta-blocker is causing hair loss.  He says when he showers large amount of hair comes out.  He denies chest pain, shortness of breath, edema, dizziness, syncope, or bleeding.    Past Medical History:   Diagnosis Date   • Asthma    • HTN (hypertension)    • Nonocclusive coronary atherosclerosis of native coronary artery     20% LI by cath 2017   • PVC (premature ventricular contraction)        Past Surgical  History:   Procedure Laterality Date   • BACK SURGERY  2005    lumbar laminectomy   • CARDIAC CATHETERIZATION N/A 9/19/2017    Procedure: Left ventriculography;  Surgeon: Luke Wong MD;  Location[facility] C[facility];  Service:    • CARDIAC CATHETERIZATION N/A 9/19/2017    Procedure: Left Heart Cath;  Surgeon: Luke Wong MD;  Location:  [facility] CATH INVASIVE LOCATION;  Service:    • CARDIAC CATHETERIZATION N/A 9/19/2017    Procedure: Coronary angiography;  Surgeon: Luke Wong MD;  Location[facility];  Service:    • HIP SURGERY Left 2016       Social History     Socioeconomic History   • Marital status:      Spouse name: Not on file   • Number of children: Not on file   • Years of education: master's degree   • Highest education level: Not on file   Occupational History   • Occupation: Retired   Tobacco Use   • Smoking status: Never Smoker   • Smokeless tobacco: Never Used   Substance and Sexual Activity   • Alcohol use: Not Currently     Comment: caffeine use: Occassionally   • Drug use: No       History reviewed. No pertinent family history.    The following portion of the patient's history were reviewed and updated as appropriate: past medical history, past surgical history, past social history, past family history, allergies, current medications, and problem list.    Review of Systems   Constitution: Negative.   Cardiovascular: Negative.    Endocrine: Negative.    Hematologic/Lymphatic: Negative.    Neurological: Negative.        No Known Allergies      Current Outpatient Medications:   •  Coenzyme Q10 (COQ10) 200 MG capsule, Take 200 mg by mouth Daily., Disp: , Rfl:   •  famotidine (PEPCID) 40 MG tablet, Take 40 mg by mouth Daily., Disp: , Rfl:   •  irbesartan (AVAPRO) 150 MG tablet, Take 150 mg by mouth Daily. 1.5 tablets daily, Disp: , Rfl:   •  metoprolol tartrate (LOPRESSOR) 50 MG tablet, Take 1 tablet by mouth 2 (Two) Times a Day., Disp:  "180 tablet, Rfl: 3  •  NIFEdipine XL (PROCARDIA XL) 30 MG 24 hr tablet, Take 30 mg by mouth Daily., Disp: , Rfl:   •  pitavastatin calcium (LIVALO) 2 MG tablet tablet, Take 2 mg by mouth Daily., Disp: , Rfl:   •  VOLTAREN 1 % gel gel, Apply  topically As Needed., Disp: , Rfl:         Objective:     Vitals:    06/24/20 1522   BP: 115/72   Pulse: 68   Weight: 87.1 kg (192 lb)   Height: 182.9 cm (72\")     Body mass index is 26.04 kg/m².    Due to telehealth visit, there was no EKG, vitals, or weight performed in our office.  Vitals/Weight were reported by the patient and conducted at home.       Assessment:       Diagnosis Plan   1. PVC (premature ventricular contraction)     2. Hair loss     3. Nonocclusive coronary atherosclerosis of native coronary artery     4. Essential hypertension            Plan:       1.  PVCs: Treated with metoprolol and he denies palpitations.    2.  Hair loss: He is concerned that the beta-blocker is causing him to lose hair.  He is not sure if the hair loss is from aging.  I recommended doing a drug holiday to see if the hair loss is from the metoprolol.  I would wean him off the metoprolol and start diltiazem 240 mg daily.  I would stop the nifedipine since that the calcium channel blocker.  He said he does not want to make a decision today and will think about it.  I have asked him to call the office if he would like to change medications.    3.  Coronary Atherosclerosis: Noted to have nonobstructive coronary artery disease per coronary angiogram.  He is not taking aspirin and I recommended that he do so.  Continue metoprolol and pitavastatin.    4.  Hypertension: Blood pressure well controlled today.    5.  I have recommended follow-up with Dr. Raudel Álvarez in 6 months, unless otherwise needed sooner.    This patient has consented to a telehealth visit via telephone. The visit was scheduled as a telephone visit to comply with patient safety concerns in accordance with CDC recommendations. "  All vitals recorded within this visit are reported by the patient.  I spent 25 minutes in total including but not limited to the 15 minutes spent in direct conversation with this patient.      As always, it has been a pleasure to participate in your patient's care. Thank you.       Sincerely,         CISCO Trotter        Dictated utilizing Dragon dictation

## 2020-06-27 ENCOUNTER — TELEPHONE (OUTPATIENT)
Dept: CARDIOLOGY | Facility: CLINIC | Age: 80
End: 2020-06-27

## 2020-06-27 NOTE — TELEPHONE ENCOUNTER
----- Message from CISCO Harrington sent at 6/25/2020  8:20 PM EDT -----    Please call him and recommend that he restart his aspirin 81 mg daily.  Thank you

## 2020-06-29 NOTE — TELEPHONE ENCOUNTER
Pt returned your call and this message was also sent to me.    I have spoken with pt and advised him to do so and he verbalized understanding

## 2020-06-30 RX ORDER — ASPIRIN 81 MG/1
81 TABLET ORAL DAILY
Qty: 30 TABLET | Refills: 0
Start: 2020-06-30 | End: 2022-05-16

## 2020-07-06 ENCOUNTER — TELEPHONE (OUTPATIENT)
Dept: CARDIOLOGY | Facility: CLINIC | Age: 80
End: 2020-07-06

## 2020-07-06 NOTE — TELEPHONE ENCOUNTER
----- Message from Gurvinder Santamaria sent at 7/6/2020 10:26 AM EDT -----  Mimi,   I am unable to reach pt:  7/6/20- LVM x 3  6/29/20- LVM x 2   6/26/20- LVM x 1  I did mail a letter asking pt to contact our office to schedule an appointment.   Fly Tapia     ----- Message -----  From: Mimi Crowell APRN  Sent: 6/25/2020   8:19 PM EDT  To: CISCO Harrington, #      Please schedule a 6-month follow-up visit with Dr. Raudel Álvarez

## 2020-07-06 NOTE — TELEPHONE ENCOUNTER
----- Message from CISCO Harrington sent at 6/25/2020  8:19 PM EDT -----    Please schedule a 6-month follow-up visit with Dr. Raudel Álvarez

## 2020-07-06 NOTE — TELEPHONE ENCOUNTER
Mimi,   I am unable to reach pt:  7/6/20- LVM x 3  6/29/20- LVM x 2   6/26/20- LVM x 1  I did mail a letter asking pt to contact our office to schedule an appointment.   Thanks,  Fly

## 2021-06-15 RX ORDER — METOPROLOL TARTRATE 50 MG/1
50 TABLET, FILM COATED ORAL 2 TIMES DAILY
Qty: 180 TABLET | Refills: 3 | Status: SHIPPED | OUTPATIENT
Start: 2021-06-15 | End: 2022-06-14 | Stop reason: SDUPTHER

## 2022-04-25 NOTE — PROGRESS NOTES
Subjective   Patient ID: Son Dang is a 81 y.o. male is being seen for consultation today at the request of Landon Hyatt MD for cervical disc disease.  MRI cervical done on 4/13/22.    Today patient reports L sided neck pian along with cracking when turning his head. Patient reports he has some neck stiffness.     This gentleman is with his wife.  Last time I saw him was close to 6 years ago.  He has known cervical stenosis with increased cord signal at C3-C4.  At the time that I saw him he really was not having much symptoms but he said about 2 or 3 months ago he began having some neck ache and stiffness and grinding noises.  It never ran down his arms and he has good strength in his upper extremities.  He does have weakness in his right calf and his right tibialis anterior documented below.  That is more from his known lumbar stenosis.  His primary care physician had ordered a new lumbar MRI but he has not had it done yet.  I think it is necessary to look at that.  I am concerned about the weakness.  He does not have much pain.  He had not had pain since I spoke with him over 6 years ago but the weakness he thinks has been going on for about 4 or 5 months.  I told him that is likely coming from the known stenosis in his spine from L2-L5 and it is worrisome because if it becomes primary could permanently affect his gait.  He will be getting the MRI done next week and I asked him to come back and see me afterwards so I can review it.  I showed him simple exercises to do for his neck and reassured him that we do not need to do anything about the neck right now.        Neck Pain   This is a new problem. The problem occurs intermittently. The problem has been gradually improving. The pain is present in the left side. The pain is at a severity of 1/10. The pain is mild. Nothing aggravates the symptoms. Pertinent negatives include no fever, headaches, numbness or weakness. The treatment provided moderate relief.        The following portions of the patient's history were reviewed and updated as appropriate: allergies, current medications, past family history, past medical history, past social history, past surgical history and problem list.    Review of Systems   Constitutional: Negative for chills and fever.   HENT: Negative for congestion.    Musculoskeletal: Positive for neck pain and neck stiffness.   Neurological: Negative for weakness, numbness and headaches.   All other systems reviewed and are negative.      Objective   Physical Exam  Constitutional:       Appearance: He is well-developed.   HENT:      Head: Normocephalic and atraumatic.   Eyes:      Extraocular Movements: EOM normal.      Conjunctiva/sclera: Conjunctivae normal.      Pupils: Pupils are equal, round, and reactive to light.   Neck:      Vascular: No carotid bruit.   Neurological:      Mental Status: He is oriented to person, place, and time.      Coordination: Finger-Nose-Finger Test and Heel to Shin Test normal.      Gait: Gait is intact.      Deep Tendon Reflexes:      Reflex Scores:       Tricep reflexes are 2+ on the right side and 2+ on the left side.       Bicep reflexes are 2+ on the right side and 2+ on the left side.       Brachioradialis reflexes are 2+ on the right side and 2+ on the left side.       Patellar reflexes are 2+ on the right side and 2+ on the left side.       Achilles reflexes are 2+ on the right side and 2+ on the left side.  Psychiatric:         Speech: Speech normal.       Neurologic Exam     Mental Status   Oriented to person, place, and time.   Registration of memory: Good recent and remote memory.   Attention: normal. Concentration: normal.   Speech: speech is normal   Level of consciousness: alert  Knowledge: consistent with education.     Cranial Nerves     CN II   Visual fields full to confrontation.   Visual acuity: normal    CN III, IV, VI   Pupils are equal, round, and reactive to light.  Extraocular motions are  normal.     CN V   Facial sensation intact.   Right corneal reflex: normal  Left corneal reflex: normal    CN VII   Facial expression full, symmetric.   Right facial weakness: none  Left facial weakness: none    CN VIII   Hearing: intact    CN IX, X   Palate: symmetric    CN XI   Right sternocleidomastoid strength: normal  Left sternocleidomastoid strength: normal    CN XII   Tongue: not atrophic  Tongue deviation: none    Motor Exam   Muscle bulk: normal  Right arm tone: normal  Left arm tone: normal  Right leg tone: normal  Left leg tone: normal    Strength   Strength 5/5 except as noted.   Right anterior tibial: 4/5  Right posterior tibial: 4/5  Right peroneal: 4/5  Right gastroc: 4/5    Sensory Exam   Light touch normal.     Gait, Coordination, and Reflexes     Gait  Gait: normal    Coordination   Finger to nose coordination: normal  Heel to shin coordination: normal    Reflexes   Right brachioradialis: 2+  Left brachioradialis: 2+  Right biceps: 2+  Left biceps: 2+  Right triceps: 2+  Left triceps: 2+  Right patellar: 2+  Left patellar: 2+  Right achilles: 2+  Left achilles: 2+  Right : 2+  Left : 2+      Assessment/Plan   Independent Review of Radiographic Studies:      Reviewed his repeat cervical MRI done on 4/13/2022 that shows the same degree of cervical stenosis at C3-C4 with increased signal which was present in 2015.  No dramatic changes since then but he does have a lot of facet disease.  I agree with the report.      Medical Decision Making:      I showed him simple exercises to do for his neck and reassured him that the grinding noises while alarming are not a threat to him.  But I am more concerned about his right calf and tibialis anterior weakness.  He will get his lumbar MRI and come and see me in a few weeks.  We may need to consider a decompression of the relevant nerve roots.      Diagnoses and all orders for this visit:    1. Cervical spinal stenosis (Primary)    2. Acute neck  pain    3. Spinal stenosis of lumbar region with neurogenic claudication    4. Calf muscle weakness      Return in about 3 weeks (around 5/19/2022) for Face-to-face.

## 2022-04-28 ENCOUNTER — OFFICE VISIT (OUTPATIENT)
Dept: NEUROSURGERY | Facility: CLINIC | Age: 82
End: 2022-04-28

## 2022-04-28 DIAGNOSIS — M48.02 CERVICAL SPINAL STENOSIS: Primary | ICD-10-CM

## 2022-04-28 DIAGNOSIS — M62.81 CALF MUSCLE WEAKNESS: ICD-10-CM

## 2022-04-28 DIAGNOSIS — M54.2 ACUTE NECK PAIN: ICD-10-CM

## 2022-04-28 DIAGNOSIS — M48.062 SPINAL STENOSIS OF LUMBAR REGION WITH NEUROGENIC CLAUDICATION: ICD-10-CM

## 2022-04-28 PROCEDURE — 99204 OFFICE O/P NEW MOD 45 MIN: CPT | Performed by: NEUROLOGICAL SURGERY

## 2022-05-04 NOTE — PROGRESS NOTES
Subjective   Patient ID: Son Dang is a 81 y.o. male is here today for follow-up of MRI lumbar at [facility] done on 6/28/21.  Pt last seen on 4/28/22 and reported neck ache and stiffness and grinding noises along with right calf and tibialis anterior weakness.  Pt instructed to do some simple exercises for his neck.    Today, Mr. Dang reports neck pain. He denies back pain and leg pain.He reports intermittent numbness in his right leg    He is with his wife.  I could not download the images from the disc from [place] but and so I will try and get them downloaded into our PACS system and revealed him later on but the report indicates he has severe stenosis at L2-L3 recurrent stenosis at L4-L5 bilateral that severe and severe foraminal stenosis bilaterally at L5-S1.  Yes she does not really have that much pain not had been in the back or the legs the left leg is fine.  The right leg does not hurt but does does have pretty significant calf weakness described below.  He does notice it when he walks.  We had a discussion about this.  I think it is related to the stenosis at L4-L5 and in particular L5-S1.  He thinks this has been going on for since around 5 months, last year shortly before [place].  He says that he can actually live with this weakness.  Initially I talked about doing a decompression on the right at L4-L5 and L5-S1 without fusion to try and restore some strength but he seemed less inclined to take that approach since he feels that he can live with the state of affairs.  If that is how he feels, I am not  objecting to that but he has to accept the possibility at this weakness could be permanent and we do have a window of opportunity to do something about it.  In any event we will try some therapy even though I am a bit skeptical that given the amount of nerve root compression that it well help much and will see him in 3 months.  If he develops pain that is a little bit  different since that is actually harder to live with then the weakness alone.      History of Present Illness    The following portions of the patient's history were reviewed and updated as appropriate: allergies, current medications, past family history, past medical history, past social history, past surgical history and problem list.    Review of Systems   Constitutional: Negative for fever.   Musculoskeletal: Positive for neck pain. Negative for back pain.   All other systems reviewed and are negative.      Objective   Physical Exam  Constitutional:       Appearance: He is well-developed.   HENT:      Head: Normocephalic and atraumatic.   Eyes:      Extraocular Movements: EOM normal.      Conjunctiva/sclera: Conjunctivae normal.      Pupils: Pupils are equal, round, and reactive to light.   Neck:      Vascular: No carotid bruit.   Neurological:      Mental Status: He is oriented to person, place, and time.      Coordination: Finger-Nose-Finger Test and Heel to Shin Test normal.      Gait: Gait is intact.      Deep Tendon Reflexes:      Reflex Scores:       Tricep reflexes are 2+ on the right side and 2+ on the left side.       Bicep reflexes are 2+ on the right side and 2+ on the left side.       Brachioradialis reflexes are 2+ on the right side and 2+ on the left side.       Patellar reflexes are 2+ on the right side and 2+ on the left side.       Achilles reflexes are 2+ on the right side and 2+ on the left side.  Psychiatric:         Speech: Speech normal.       Neurologic Exam     Mental Status   Oriented to person, place, and time.   Registration of memory: Good recent and remote memory.   Attention: normal. Concentration: normal.   Speech: speech is normal   Level of consciousness: alert  Knowledge: consistent with education.     Cranial Nerves     CN II   Visual fields full to confrontation.   Visual acuity: normal    CN III, IV, VI   Pupils are equal, round, and reactive to light.  Extraocular motions  are normal.     CN V   Facial sensation intact.   Right corneal reflex: normal  Left corneal reflex: normal    CN VII   Facial expression full, symmetric.   Right facial weakness: none  Left facial weakness: none    CN VIII   Hearing: intact    CN IX, X   Palate: symmetric    CN XI   Right sternocleidomastoid strength: normal  Left sternocleidomastoid strength: normal    CN XII   Tongue: not atrophic  Tongue deviation: none    Motor Exam   Muscle bulk: normal  Right arm tone: normal  Left arm tone: normal  Right leg tone: normal  Left leg tone: normal    Strength   Strength 5/5 except as noted.   Right gastroc: 3/5    Sensory Exam   Light touch normal.     Gait, Coordination, and Reflexes     Gait  Gait: normal    Coordination   Finger to nose coordination: normal  Heel to shin coordination: normal    Reflexes   Right brachioradialis: 2+  Left brachioradialis: 2+  Right biceps: 2+  Left biceps: 2+  Right triceps: 2+  Left triceps: 2+  Right patellar: 2+  Left patellar: 2+  Right achilles: 2+  Left achilles: 2+  Right : 2+  Left : 2+      Assessment & Plan   Independent Review of Radiographic Studies:      Reviewed his repeat cervical MRI done on 4/13/2022 that shows the same degree of cervical stenosis at C3-C4 with increased signal which was present in 2015.  No dramatic changes since then but he does have a lot of facet disease.  I agree with the report.    I reviewed the report of his lumbar MRI done on 5/6/2022.  I was not able to download the images but the report indicated severe recurrent stenosis at L4-L5 and L5-S1 and L2-L3.  No spondylolisthesis.  Severe nerve root compression all those levels.    Medical Decision Making:      He feels he can tolerate the weakness right now so contrary to what I initially told him we will hold off on any kind of surgery.  If he does start developing pain in his leg he will let me know.  We will send her to therapy to see what improvement we can get.  I am not  overly optimistic since the degree of stenosis is severe.  I will see him in 3 months to see if there is any improvement in the calf weakness.  I will download his images from [place] into our PACS system so I can personally review the images.    Diagnoses and all orders for this visit:    1. Calf muscle weakness (Primary)    2. Spinal stenosis of lumbar region with neurogenic claudication  -     Ambulatory Referral to Physical Therapy Evaluate and treat    3. Cervical spinal stenosis  -     Ambulatory Referral to Physical Therapy Evaluate and treat    Other orders  -     [facility] - IMAGING      Return in about 3 months (around 8/16/2022) for Face-to-face.

## 2022-05-16 ENCOUNTER — OFFICE VISIT (OUTPATIENT)
Dept: NEUROSURGERY | Facility: CLINIC | Age: 82
End: 2022-05-16

## 2022-05-16 VITALS
SYSTOLIC BLOOD PRESSURE: 160 MMHG | WEIGHT: 192 LBS | DIASTOLIC BLOOD PRESSURE: 88 MMHG | OXYGEN SATURATION: 98 % | HEART RATE: 87 BPM | BODY MASS INDEX: 26.01 KG/M2 | HEIGHT: 72 IN | TEMPERATURE: 97.8 F

## 2022-05-16 DIAGNOSIS — M48.02 CERVICAL SPINAL STENOSIS: ICD-10-CM

## 2022-05-16 DIAGNOSIS — M48.062 SPINAL STENOSIS OF LUMBAR REGION WITH NEUROGENIC CLAUDICATION: ICD-10-CM

## 2022-05-16 DIAGNOSIS — M62.81 CALF MUSCLE WEAKNESS: Primary | ICD-10-CM

## 2022-05-16 PROCEDURE — 99214 OFFICE O/P EST MOD 30 MIN: CPT | Performed by: NEUROLOGICAL SURGERY

## 2022-06-02 ENCOUNTER — TELEPHONE (OUTPATIENT)
Dept: NEUROSURGERY | Facility: CLINIC | Age: 82
End: 2022-06-02

## 2022-06-02 NOTE — TELEPHONE ENCOUNTER
Caller: Laurence Son    Relationship: Self    Best call back number: [phone]    What is the best time to reach you: ANYTIME    Who are you requesting to speak with (clinical staff, provider,  specific staff member): CLINICAL STAFF    Do you know the name of the person who called: NA    What was the call regarding: PT CALLED AND IS ASKING OF OUR OFFICE HAS RECEIVED THE IMAGING FROM THE [facility] [place]-PT STATES THAT HE WAS TOLD IT WAS RECEIVED AT 5 P.M. YESTERDAY-PT WOULD LIKE A CONFIRMATION-PT IS ASKING IF THIS IS A WAY TO SEND HIS IMAGING IN THE FUTURE, SO HE DOES NOT RUN INTO THE RISK OF IT NOT WORKING-PT WOULD LIKE A CALL BACK PLEASE THANK YOU!    Do you require a callback: YES PLEASE

## 2022-06-03 NOTE — TELEPHONE ENCOUNTER
I called and spoke with the patient and verified that his images are available in his chart to us.

## 2022-06-14 RX ORDER — METOPROLOL TARTRATE 50 MG/1
50 TABLET, FILM COATED ORAL 2 TIMES DAILY
Qty: 180 TABLET | Refills: 3 | Status: SHIPPED | OUTPATIENT
Start: 2022-06-14

## 2022-08-12 NOTE — PROGRESS NOTES
Subjective   Patient ID: Son Dang is a 81 y.o. male is here today for follow-up of calf muscle weakness.  Pt last seen on 5/16/22 and reported neck ache and stiffness and grinding noises along with right calf and tibialis anterior weakness along with intermittent numbness in his R leg. Pt referred to physical therapy at last visit.    Mr. Dang reports physical therapy went well and he continues doing home exercises. He reports his legs are feeling stronger. His calf does not seem to be stronger when he is taking a step. He is having intermittent neck pain on the left side, he feels as if it is more muscular in nature and it is more of tightness. He denies any pain, numbness or tingling of his arms.     This patient is seen with his wife.  I have been following him for some cervical stenosis.  The images from [place] were finally downloaded into our PACS system and I reviewed them.  I do not think his cervical stenosis has progressed much.  He does not describe any difficulty using his hands.  He has no long track signs.  Apart from his calf weakness documented below, he has no motor deficit.  We will continue watching him for his cervical stenosis.  As for his recurrent lumbar stenosis he does not have much pain.  He thinks therapy helped him but I think his Strength is about the same on the right but he says in no uncertain terms that he can live with it.  He had a lumbar decompression in 2005 and at that time he had severe radicular pain so he knows what that feels like.  He is aware that the pain could come back and if it does he will let us know.  He is aware that he could develop weakness in his left calf which could severely affect his walking and if that begins to happen he will let us know.  But as far as his unilateral calf weakness goes without pain and he feels that he can simply live with this and so we will just continue to watch him.  I will see him in 9 months.      History of Present  Illness    The following portions of the patient's history were reviewed and updated as appropriate: allergies, current medications, past family history, past medical history, past social history, past surgical history and problem list.    Review of Systems   Constitutional: Negative for activity change and fever.   Musculoskeletal: Positive for neck pain. Negative for back pain.   Neurological: Positive for weakness. Negative for numbness.   Psychiatric/Behavioral: Negative for sleep disturbance.   All other systems reviewed and are negative.      Objective   Physical Exam  Constitutional:       Appearance: He is well-developed.   HENT:      Head: Normocephalic and atraumatic.   Eyes:      Extraocular Movements: EOM normal.      Conjunctiva/sclera: Conjunctivae normal.      Pupils: Pupils are equal, round, and reactive to light.   Neck:      Vascular: No carotid bruit.   Neurological:      Mental Status: He is oriented to person, place, and time.      Coordination: Finger-Nose-Finger Test and Heel to Shin Test normal.      Gait: Gait is intact.      Deep Tendon Reflexes:      Reflex Scores:       Tricep reflexes are 2+ on the right side and 2+ on the left side.       Bicep reflexes are 2+ on the right side and 2+ on the left side.       Brachioradialis reflexes are 2+ on the right side and 2+ on the left side.       Patellar reflexes are 2+ on the right side and 2+ on the left side.       Achilles reflexes are 2+ on the right side and 2+ on the left side.  Psychiatric:         Speech: Speech normal.       Neurologic Exam     Mental Status   Oriented to person, place, and time.   Registration of memory: Good recent and remote memory.   Attention: normal. Concentration: normal.   Speech: speech is normal   Level of consciousness: alert  Knowledge: consistent with education.     Cranial Nerves     CN II   Visual fields full to confrontation.   Visual acuity: normal    CN III, IV, VI   Pupils are equal, round, and  reactive to light.  Extraocular motions are normal.     CN V   Facial sensation intact.   Right corneal reflex: normal  Left corneal reflex: normal    CN VII   Facial expression full, symmetric.   Right facial weakness: none  Left facial weakness: none    CN VIII   Hearing: intact    CN IX, X   Palate: symmetric    CN XI   Right sternocleidomastoid strength: normal  Left sternocleidomastoid strength: normal    CN XII   Tongue: not atrophic  Tongue deviation: none    Motor Exam   Muscle bulk: normal  Right arm tone: normal  Left arm tone: normal  Right leg tone: normal  Left leg tone: normal    Strength   Strength 5/5 except as noted.   Right gastroc: 3/5    Sensory Exam   Light touch normal.     Gait, Coordination, and Reflexes     Gait  Gait: normal    Coordination   Finger to nose coordination: normal  Heel to shin coordination: normal    Reflexes   Right brachioradialis: 2+  Left brachioradialis: 2+  Right biceps: 2+  Left biceps: 2+  Right triceps: 2+  Left triceps: 2+  Right patellar: 2+  Left patellar: 2+  Right achilles: 2+  Left achilles: 2+  Right : 2+  Left : 2+      Assessment & Plan   Independent Review of Radiographic Studies:      Reviewed his repeat cervical MRI done on 4/13/2022 that shows the same degree of cervical stenosis at C3-C4 with increased signal which was present in 2015.  No dramatic changes since then but he does have a lot of facet disease. I reviewed the report of his lumbar MRI done on 5/6/2022.  There is  severe recurrent stenosis at L4-L5 and L5-S1 and L2-L3.  No spondylolisthesis.  Severe nerve root compression all those levels. Agree with report.    Medical Decision Making:      We will continue to watch him.  He says he can live with the right calf weakness.  There is no sign that he is developing a progressive cervical myelopathy.  I will see him in 9 months.  If he develops symptoms such as loss of dexterity in the hands, balance problems, severe radicular pain in the  legs or weakness in the left calf, he will let me know.      Diagnoses and all orders for this visit:    1. Cervical spinal stenosis (Primary)    2. Spinal stenosis of lumbar region with neurogenic claudication    3. Calf muscle weakness    4. Chronic neck pain      Return in about 9 months (around 5/17/2023) for Face-to-face.

## 2022-08-17 ENCOUNTER — OFFICE VISIT (OUTPATIENT)
Dept: NEUROSURGERY | Facility: CLINIC | Age: 82
End: 2022-08-17

## 2022-08-17 VITALS
WEIGHT: 192.24 LBS | HEIGHT: 72 IN | DIASTOLIC BLOOD PRESSURE: 82 MMHG | OXYGEN SATURATION: 98 % | TEMPERATURE: 97.8 F | SYSTOLIC BLOOD PRESSURE: 128 MMHG | HEART RATE: 60 BPM | BODY MASS INDEX: 26.04 KG/M2

## 2022-08-17 DIAGNOSIS — G89.29 CHRONIC NECK PAIN: ICD-10-CM

## 2022-08-17 DIAGNOSIS — M54.2 CHRONIC NECK PAIN: ICD-10-CM

## 2022-08-17 DIAGNOSIS — M62.81 CALF MUSCLE WEAKNESS: ICD-10-CM

## 2022-08-17 DIAGNOSIS — M48.062 SPINAL STENOSIS OF LUMBAR REGION WITH NEUROGENIC CLAUDICATION: ICD-10-CM

## 2022-08-17 DIAGNOSIS — M48.02 CERVICAL SPINAL STENOSIS: Primary | ICD-10-CM

## 2022-08-17 PROCEDURE — 99214 OFFICE O/P EST MOD 30 MIN: CPT | Performed by: NEUROLOGICAL SURGERY

## 2023-05-02 RX ORDER — METOPROLOL TARTRATE 50 MG/1
50 TABLET, FILM COATED ORAL 2 TIMES DAILY
Qty: 180 TABLET | Refills: 2 | Status: SHIPPED | OUTPATIENT
Start: 2023-05-02

## 2023-05-02 NOTE — TELEPHONE ENCOUNTER
Rx Refill Note  Requested Prescriptions     Pending Prescriptions Disp Refills    metoprolol tartrate (LOPRESSOR) 50 MG tablet [Pharmacy Med Name: METOPROL TAR 50MG] 180 tablet 2     Sig: Take 1 tablet by mouth 2 (Two) Times a Day.      Last office visit with prescribing clinician: Visit date not found   Last telemedicine visit with prescribing clinician: Visit date not found   Next office visit with prescribing clinician: Visit date not found                         Would you like a call back once the refill request has been completed: [] Yes [] No    If the office needs to give you a call back, can they leave a voicemail: [] Yes [] No    Yancy Small MA  05/02/23, 16:19 EDT

## 2023-05-12 NOTE — PROGRESS NOTES
"Subjective   Patient ID: Son Dang is a 82 y.o. male is here today for follow-up.    Very nice patient is with his wife.  I have been following him for the last few years mainly for his severe radiographic cervical stenosis and C3-C4.  As far as his hand function and his balance goes its been about the same and pretty stable.  He is able to use his hands when he uses a keyboard on the computer.  He also has a history of a lumbar surgery in 2005 in [place].  He has baseline right calf weakness and that has not changed.  He has a little bit more back pain when he walks.  I suggested that we try a simple brace to help offload the spine and that might help and when he stands and walks.  I also encouraged him to perhaps substitute walking for other cardiovascular exercises such as stationary bicycling or an aerodyne bike to help reduce his back pain when he is exercising.  But essentially he is clinically stable.  I will see him in 1 year, sooner if there is worsening of symptoms.        History of Present Illness    The following portions of the patient's history were reviewed and updated as appropriate: allergies, current medications, past family history, past medical history, past social history, past surgical history and problem list.    Review of Systems   Constitutional: Negative for fever.   All other systems reviewed and are negative.          Objective     Vitals:    05/17/23 1313   BP: 134/82   BP Location: Left arm   Patient Position: Sitting   Cuff Size: Adult   Weight: 87.1 kg (192 lb)   Height: 182.9 cm (72.01\")     Body mass index is 26.03 kg/m².    Tobacco Use: Low Risk    • Smoking Tobacco Use: Never   • Smokeless Tobacco Use: Never   • Passive Exposure: Not on file          Physical Exam  Constitutional:       Appearance: He is well-developed.   HENT:      Head: Normocephalic and atraumatic.   Eyes:      Extraocular Movements: EOM normal.      Conjunctiva/sclera: Conjunctivae normal.      Pupils: " Pupils are equal, round, and reactive to light.   Neck:      Vascular: No carotid bruit.   Neurological:      Mental Status: He is oriented to person, place, and time.      Coordination: Finger-Nose-Finger Test and Heel to Shin Test normal.      Gait: Gait is intact.      Deep Tendon Reflexes:      Reflex Scores:       Tricep reflexes are 2+ on the right side and 2+ on the left side.       Bicep reflexes are 2+ on the right side and 2+ on the left side.       Brachioradialis reflexes are 2+ on the right side and 2+ on the left side.       Patellar reflexes are 2+ on the right side and 2+ on the left side.       Achilles reflexes are 2+ on the right side and 2+ on the left side.  Psychiatric:         Speech: Speech normal.       Neurologic Exam     Mental Status   Oriented to person, place, and time.   Registration of memory: Good recent and remote memory.   Attention: normal. Concentration: normal.   Speech: speech is normal   Level of consciousness: alert  Knowledge: consistent with education.     Cranial Nerves     CN II   Visual fields full to confrontation.   Visual acuity: normal    CN III, IV, VI   Pupils are equal, round, and reactive to light.  Extraocular motions are normal.     CN V   Facial sensation intact.   Right corneal reflex: normal  Left corneal reflex: normal    CN VII   Facial expression full, symmetric.   Right facial weakness: none  Left facial weakness: none    CN VIII   Hearing: intact    CN IX, X   Palate: symmetric    CN XI   Right sternocleidomastoid strength: normal  Left sternocleidomastoid strength: normal    CN XII   Tongue: not atrophic  Tongue deviation: none    Motor Exam   Muscle bulk: normal  Right arm tone: normal  Left arm tone: normal  Right leg tone: normal  Left leg tone: normal    Strength   Strength 5/5 except as noted.   Right gastroc: 3/5    Sensory Exam   Light touch normal.     Gait, Coordination, and Reflexes     Gait  Gait: normal    Coordination   Finger to nose  coordination: normal  Heel to shin coordination: normal    Reflexes   Right brachioradialis: 2+  Left brachioradialis: 2+  Right biceps: 2+  Left biceps: 2+  Right triceps: 2+  Left triceps: 2+  Right patellar: 2+  Left patellar: 2+  Right achilles: 2+  Left achilles: 2+  Right : 2+  Left : 2+          Assessment & Plan   Independent Review of Radiographic Studies:      I personally reviewed the images from the following studies.    Reviewed his repeat cervical MRI done on 4/13/2022 that shows the same degree of cervical stenosis at C3-C4 with increased signal which was present in 2015.  No dramatic changes since then but he does have a lot of facet disease. I reviewed the report of his lumbar MRI done on 5/6/2022.  There is  severe recurrent stenosis at L4-L5 and L5-S1 and L2-L3.  No spondylolisthesis.  Severe nerve root compression all those levels. Agree with report.    Medical Decision Making:      We will give him a prescription for an LSO corset type brace which he can use when he stands and walks.  He will consider trying other forms of cardiovascular exercise such as a stationary bicycle or an aerodyne bike.  We will keep an eye on him and I will see him in 1 year.  If there is a worsening of his hand function of his balance or he has sciatic pain, he will let me know.        Diagnoses and all orders for this visit:    1. Cervical spinal stenosis (Primary)    2. Spinal stenosis of lumbar region with neurogenic claudication  -     Back Brace    3. Calf muscle weakness    4. History of lumbar surgery  -     Back Brace      Return in about 1 year (around 5/17/2024) for face to face.

## 2023-05-17 ENCOUNTER — OFFICE VISIT (OUTPATIENT)
Dept: NEUROSURGERY | Facility: CLINIC | Age: 83
End: 2023-05-17
Payer: MEDICARE

## 2023-05-17 VITALS
SYSTOLIC BLOOD PRESSURE: 134 MMHG | BODY MASS INDEX: 26.01 KG/M2 | WEIGHT: 192 LBS | HEIGHT: 72 IN | DIASTOLIC BLOOD PRESSURE: 82 MMHG

## 2023-05-17 DIAGNOSIS — M48.062 SPINAL STENOSIS OF LUMBAR REGION WITH NEUROGENIC CLAUDICATION: ICD-10-CM

## 2023-05-17 DIAGNOSIS — Z98.890 HISTORY OF LUMBAR SURGERY: ICD-10-CM

## 2023-05-17 DIAGNOSIS — M48.02 CERVICAL SPINAL STENOSIS: Primary | ICD-10-CM

## 2023-05-17 DIAGNOSIS — M62.81 CALF MUSCLE WEAKNESS: ICD-10-CM

## 2023-05-17 PROCEDURE — 99213 OFFICE O/P EST LOW 20 MIN: CPT | Performed by: NEUROLOGICAL SURGERY

## 2023-10-05 ENCOUNTER — TELEPHONE (OUTPATIENT)
Dept: NEUROSURGERY | Facility: CLINIC | Age: 83
End: 2023-10-05
Payer: MEDICARE

## 2023-10-05 NOTE — TELEPHONE ENCOUNTER
Patient called about increased pain in the Rt hip.  No radiating pain down the rt leg, but numbness in the Rt. Foot.  Dr VASQUEZ told him to call back as soon as possible if symptoms changed.  He is currently in PT.  These new symptoms started 2 days ago around 10-.  Please advise.  He states surgery was discussed but conservative treatment was tried first.

## 2023-10-17 NOTE — TELEPHONE ENCOUNTER
Contacted patient he said he has been treating it with ice and heat and some tylenol, is not in a lot of pain at the moment He said he would call back if symptom reappear.

## 2024-05-07 NOTE — PROGRESS NOTES
Subjective   Patient ID: Son Dang is a 83 y.o. male is here today for follow-up Spinal stenosis of lumbar region with neurogenic claudication     This very nice gentleman is here with his wife.  I saw him a year ago.  He has known cervical stenosis but no overt progressive myelopathy.  He can button buttons and tie shoelaces.  His reflexes are normal and he has no pathological reflexes.  He has previous surgery in the low back and chronic calf weakness on the right.  He has intermittent sciatica from his recurrent spinal stenosis.  I reviewed with him the new pictures that were ordered presumably by his primary care physician.  I do not think there is much change and I do not think we need to pursue any surgical treatment.  If the back and the leg pain particular on the right gets worse then we might consider injections again or scheduled gabapentin but he states that he is quite functional and is able to do things that he wants and needs to do.  So we will see him again in 1 year, sooner if there is any deterioration.    History of Present Illness    The following portions of the patient's history were reviewed and updated as appropriate: allergies, current medications, past family history, past medical history, past social history, past surgical history, and problem list.    Review of Systems   Constitutional:  Negative for fever.   Cardiovascular:  Positive for leg swelling (left caft and left foot).   Musculoskeletal:  Positive for gait problem. Negative for back pain and joint swelling.   Neurological:  Positive for weakness (right side and foot ankle) and numbness. Negative for dizziness, tremors and headaches.   Hematological:  Does not bruise/bleed easily.   All other systems reviewed and are negative.          Objective     Vitals:    05/15/24 1531   BP: 140/90     There is no height or weight on file to calculate BMI.    Tobacco Use: Low Risk  (5/15/2024)    Patient History     Smoking Tobacco Use: Never      Smokeless Tobacco Use: Never     Passive Exposure: Not on file          Physical Exam  Neurologic Exam      Physical Exam  Constitutional:       Appearance: He is well-developed.   HENT:      Head: Normocephalic and atraumatic.   Eyes:      Extraocular Movements: EOM normal.      Conjunctiva/sclera: Conjunctivae normal.      Pupils: Pupils are equal, round, and reactive to light.   Neck:      Vascular: No carotid bruit.   Neurological:      Mental Status: He is oriented to person, place, and time.      Coordination: Finger-Nose-Finger Test and Heel to Shin Test normal.      Gait: Gait is intact.      Deep Tendon Reflexes:      Reflex Scores:       Tricep reflexes are 2+ on the right side and 2+ on the left side.       Bicep reflexes are 2+ on the right side and 2+ on the left side.       Brachioradialis reflexes are 2+ on the right side and 2+ on the left side.       Patellar reflexes are 2+ on the right side and 2+ on the left side.       Achilles reflexes are 2+ on the right side and 2+ on the left side.  Psychiatric:         Speech: Speech normal.         Neurologic Exam      Mental Status   Oriented to person, place, and time.   Registration of memory: Good recent and remote memory.   Attention: normal. Concentration: normal.   Speech: speech is normal   Level of consciousness: alert  Knowledge: consistent with education.     Cranial Nerves      CN II   Visual fields full to confrontation.   Visual acuity: normal     CN III, IV, VI   Pupils are equal, round, and reactive to light.  Extraocular motions are normal.      CN V   Facial sensation intact.   Right corneal reflex: normal  Left corneal reflex: normal     CN VII   Facial expression full, symmetric.   Right facial weakness: none  Left facial weakness: none     CN VIII   Hearing: intact     CN IX, X   Palate: symmetric     CN XI   Right sternocleidomastoid strength: normal  Left sternocleidomastoid strength: normal     CN XII   Tongue: not  atrophic  Tongue deviation: none     Motor Exam   Muscle bulk: normal  Right arm tone: normal  Left arm tone: normal  Right leg tone: normal  Left leg tone: normal     Strength   Strength 5/5 except as noted.   Right gastroc: 3/5     Sensory Exam   Light touch normal.     Gait, Coordination, and Reflexes      Gait  Gait: normal     Coordination   Finger to nose coordination: normal  Heel to shin coordination: normal     Reflexes   Right brachioradialis: 2+  Left brachioradialis: 2+  Right biceps: 2+  Left biceps: 2+  Right triceps: 2+  Left triceps: 2+  Right patellar: 2+  Left patellar: 2+  Right achilles: 2+  Left achilles: 2+  Right : 2+  Left : 2+    Assessment & Plan   Independent Review of Radiographic Studies:      I personally reviewed the images from the following studies.    The MRIs done just recently on 5/18/2024 were compared with the pictures done in 2022.  The degree of cervical stenosis at C3-C4 is about the same.  I do not think there is any dramatic changes.  And then the severe recurrent stenosis at L4-L5 and L5-S1 and L2-L3 are also about the same compared to 2022.  Agree with the report.        Medical Decision Making:      Again, despite his severe radiographic stenosis he is functional and has no new motor deficits of any significance.  I told him that there is no need to surgically intervene.  If his episodes of sciatica are not self-limiting as they tend to be right now that he needs to let me know and we can treat him more aggressively possibly with more injections or scheduled gabapentin.  I will see him in 1 year, sooner if there is a change.  I again urged him to speak with Dr. Lovelace about his left bicep.        Diagnoses and all orders for this visit:    1. Cervical spinal stenosis (Primary)    2. Calf muscle weakness    3. Spinal stenosis of lumbar region with neurogenic claudication    4. History of lumbar surgery      Return in about 1 year (around 5/15/2025) for  Face-to-face.

## 2024-05-15 ENCOUNTER — OFFICE VISIT (OUTPATIENT)
Dept: NEUROSURGERY | Facility: CLINIC | Age: 84
End: 2024-05-15
Payer: MEDICARE

## 2024-05-15 VITALS — SYSTOLIC BLOOD PRESSURE: 140 MMHG | DIASTOLIC BLOOD PRESSURE: 90 MMHG

## 2024-05-15 DIAGNOSIS — M62.81 CALF MUSCLE WEAKNESS: ICD-10-CM

## 2024-05-15 DIAGNOSIS — M48.062 SPINAL STENOSIS OF LUMBAR REGION WITH NEUROGENIC CLAUDICATION: ICD-10-CM

## 2024-05-15 DIAGNOSIS — Z98.890 HISTORY OF LUMBAR SURGERY: ICD-10-CM

## 2024-05-15 DIAGNOSIS — M48.02 CERVICAL SPINAL STENOSIS: Primary | ICD-10-CM

## 2024-05-15 PROCEDURE — 1160F RVW MEDS BY RX/DR IN RCRD: CPT | Performed by: NEUROLOGICAL SURGERY

## 2024-05-15 PROCEDURE — 1159F MED LIST DOCD IN RCRD: CPT | Performed by: NEUROLOGICAL SURGERY

## 2024-05-15 PROCEDURE — 99214 OFFICE O/P EST MOD 30 MIN: CPT | Performed by: NEUROLOGICAL SURGERY

## 2024-12-05 NOTE — TELEPHONE ENCOUNTER
Protocol Failed    NOV - no future office visits scheduled.     LOV 6/24/2020 (TK)    Plan:       1.  PVCs: Treated with metoprolol and he denies palpitations.     2.  Hair loss: He is concerned that the beta-blocker is causing him to lose hair.  He is not sure if the hair loss is from aging.  I recommended doing a drug holiday to see if the hair loss is from the metoprolol.  I would wean him off the metoprolol and start diltiazem 240 mg daily.  I would stop the nifedipine since that the calcium channel blocker.  He said he does not want to make a decision today and will think about it.  I have asked him to call the office if he would like to change medications.     3.  Coronary Atherosclerosis: Noted to have nonobstructive coronary artery disease per coronary angiogram.  He is not taking aspirin and I recommended that he do so.  Continue metoprolol and pitavastatin.     4.  Hypertension: Blood pressure well controlled today.     5.  I have recommended follow-up with Dr. Raudel Álvarez in 6 months, unless otherwise needed sooner.

## 2024-12-06 RX ORDER — METOPROLOL TARTRATE 25 MG/1
75 TABLET, FILM COATED ORAL 2 TIMES DAILY
Qty: 540 TABLET | Refills: 3 | OUTPATIENT
Start: 2024-12-06

## (undated) DEVICE — KT MANIFLD CARDIAC

## (undated) DEVICE — GW EMR FIX EXCHG J STD .035 3MM 260CM

## (undated) DEVICE — GLIDESHEATH BASIC HYDROPHILIC COATED INTRODUCER SHEATH: Brand: GLIDESHEATH

## (undated) DEVICE — CATH DIAG IMPULSE PIG 5F 100CM

## (undated) DEVICE — CATH DIAG IMPULSE FL3.5 5F 100CM

## (undated) DEVICE — PK CATH CARD 40

## (undated) DEVICE — CATH DIAG IMPULSE FR5 5F 100CM